# Patient Record
(demographics unavailable — no encounter records)

---

## 2025-02-06 NOTE — ASSESSMENT
[FreeTextEntry1] : MRI L Spine PT Meds.  F/up after MRI   Muscle Relaxants- To help decrease muscle spasm and assist with pain relief. Advised of sedating effects and instructed not to drive, operate heavy machinery, or take with other sedating medications. NSAIDs- Patient warned of risk of medication to GI tract, increased blood pressure, cardiac risk, and risk of fluid retention.  Advised to clear medication with internist or PCP if any concurrent health problem with heart, blood pressure, or GI system exists.

## 2025-02-06 NOTE — HISTORY OF PRESENT ILLNESS
[Not working due to injury] : Work status: not working due to injury [] : yes [de-identified] : DOI 12/27/24 Occ: HHA  2/6/25: 44 y.o patient is here for eval of lower back for WC injury. 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 She stayed home for the weekend to rest, and returned to work for another client and she felt her back twist. She went to her PCP, had XR. Currently OOW since 1/3/24. She c/o pain along her lower back that radiates down her left leg, endorses intermittent N/T down left leg.  [de-identified] : Home health aide

## 2025-02-06 NOTE — IMAGING
[de-identified] : LSPINE Inspection: No rash or ecchymosis Palpation: No tenderness to palpation or spasm in bilateral thoracic and lumbar paraspinal musculature, no SI joint tenderness to palpation ROM: Full with stiffness Strength: 5/5 bilateral hip flexors, knee extensors, ankle dorsiflexors, EHL, ankle plantarflexors Sensation: Sensation present to light touch bilateral L2-S1 distributions Provocative maneuvers: + L straight leg raise [Outside films reviewed] : Outside films reviewed [Disc space narrowing] : Disc space narrowing

## 2025-03-13 NOTE — WORK
[Other: ___] : [unfilled] [Was the competent medical cause of the injury] : was the competent medical cause of the injury [Are consistent with the injury] : are consistent with the injury [Consistent with my objective findings] : consistent with my objective findings [Partial] : partial [Does not reveal pre-existing condition(s) that may affect treatment/prognosis] : does not reveal pre-existing condition(s) that may affect treatment/prognosis

## 2025-03-13 NOTE — ASSESSMENT
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 Advised of need to taper on/off medication and risk of abruptly stopping gabapentin.  NSAIDs- Patient warned of risk of medication to GI tract, increased blood pressure, cardiac risk, and risk of fluid retention.  Advised to clear medication with internist or PCP if any concurrent health problem with heart, blood pressure, or GI system exists.

## 2025-03-13 NOTE — IMAGING
[Outside films reviewed] : Outside films reviewed [Disc space narrowing] : Disc space narrowing [de-identified] : LSPINE Palpation: No tenderness to palpation or spasm in bilateral thoracic and lumbar paraspinal musculature, no SI joint tenderness to palpation ROM: Full with stiffness Strength: 5/5 bilateral hip flexors, knee extensors, ankle dorsiflexors, EHL, ankle plantarflexors Sensation: Sensation present to light touch bilateral L2-S1 distributions Provocative maneuvers: + L straight leg raise

## 2025-03-13 NOTE — HISTORY OF PRESENT ILLNESS
[Not working due to injury] : Work status: not working due to injury [] : yes [de-identified] : DOI 12/27/24 Occ: HHA  2/24/2025 Lumbar MRI  - report noted in chart.  Ind. review- L paracentral HNP L4/5 with LR narrowing ================================================================================================= 2/6/25: 44 y.o patient is here for eval of lower back for WC injury. 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 She stayed home for the weekend to rest, and returned to work for another client and she felt her back twist. She went to her PCP, had XR. Currently OOW since 1/3/24. She c/o pain along her lower back that radiates down her left leg, endorses intermittent N/T down left leg.   03/13/2025: patient is here to discuss MRI results. patient states since last visit no changes. Still pain, N/T down the LLE when walking.  [de-identified] : Home health aide

## 2025-05-08 NOTE — HISTORY OF PRESENT ILLNESS
[Not working due to injury] : Work status: not working due to injury [] : yes [de-identified] : DOI 12/27/24 Occ: HHA  2/24/2025 Lumbar MRI  - report noted in chart.  Ind. review- L paracentral HNP L4/5 with LR narrowing ================================================================================================= 2/6/25: 44 y.o patient is here for eval of lower back for WC injury. 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 She stayed home for the weekend to rest, and returned to work for another client and she felt her back twist. She went to her PCP, had XR. Currently OOW since 1/3/24. She c/o pain along her lower back that radiates down her left leg, endorses intermittent N/T down left leg.   03/13/2025: patient is here to discuss MRI results. patient states since last visit no changes. Still pain, N/T down the LLE when walking.  05/08/2025: patient is here to follow up. she notes pain has increased, had severe pain yesterday after physical therapy. doing physical therapy 3x weekly. Just started PT as it was approved in April. taking gabapentin with mild relief.  Still radiating down the LLE.  [de-identified] : Home health aide

## 2025-05-08 NOTE — ASSESSMENT
[FreeTextEntry1] : L paracentral HNP L4/5 with LR narrowing  C/w PT  Meds F/up in 6 weeks Pain c/s  Gabapentin- Patient advised of sedating effects, instructed not to drive, operate machinery, or take with other sedating medications. Advised of need to taper on/off medication and risk of abruptly stopping gabapentin.  NSAIDs- Patient warned of risk of medication to GI tract, increased blood pressure, cardiac risk, and risk of fluid retention.  Advised to clear medication with internist or PCP if any concurrent health problem with heart, blood pressure, or GI system exists.

## 2025-05-08 NOTE — IMAGING
[Outside films reviewed] : Outside films reviewed [Disc space narrowing] : Disc space narrowing [de-identified] : LSPINE Palpation: No tenderness to palpation or spasm in bilateral thoracic and lumbar paraspinal musculature, no SI joint tenderness to palpation ROM: Full with stiffness Strength: 5/5 bilateral hip flexors, knee extensors, ankle dorsiflexors, EHL, ankle plantarflexors Sensation: Sensation present to light touch bilateral L2-S1 distributions Provocative maneuvers: + L straight leg raise

## 2025-05-19 NOTE — DISCUSSION/SUMMARY
[de-identified] : WC - DOI 12/27/2024  ARSENIO GRULLON is a 44 year-old woman presenting for a NPV for a history of chronic low back pain.    Plan: 1) MRI lumbar spine images reviewed with the patient.

## 2025-05-19 NOTE — HISTORY OF PRESENT ILLNESS
[FreeTextEntry1] : WC - DOI 12/27/2024 5/19/2025: ARSENIO GRULLON is a 44 year-old woman presenting for a NPV for a history of chronic low back pain.    The patient states that average pain over the past week was /10 in severity. Mood: Sleep:   Prior treatment:

## 2025-05-19 NOTE — DATA REVIEWED
[MRI] : MRI [Lumbar Spine] : lumbar spine [Report was reviewed and noted in the chart] : The report was reviewed and noted in the chart [I independently reviewed and interpreted images and report] : I independently reviewed and interpreted images and report [FreeTextEntry1] : 2/24/2025 MRI Lumbar Spine O&C L3-L4: small disc bulge, mild bilateral NF stenosis L4-L5: small disc bulge, mild bilateral facet arthrosis

## 2025-06-14 NOTE — DATA REVIEWED
[MRI] : MRI [Lumbar Spine] : lumbar spine [Report was reviewed and noted in the chart] : The report was reviewed and noted in the chart [I independently reviewed and interpreted images and report] : I independently reviewed and interpreted images and report [FreeTextEntry1] : 2/24/2025 MRI Lumbar Spine O&C L3-L4: small disc bulge, mild bilateral NF stenosis L4-L5: small disc bulge, mild bilateral facet OA  Oriented - self; Oriented - place; Oriented - time

## 2025-06-14 NOTE — DISCUSSION/SUMMARY
[de-identified] : WC - DOI 12/27/2024  ARSENIO GRULLON is a 44 year-old woman presenting for a NPV for a history of chronic low back pain.     Plan: 1) MRI lumbar spine images reviewed with the patient.

## 2025-06-14 NOTE — HISTORY OF PRESENT ILLNESS
[FreeTextEntry1] : WC - DOI 12/27/2024 6/16/2025: ARSENIO GRULLON is a 44 year-old woman presenting for a NPV for a history of chronic low back pain.    The patient states that average pain over the past week was /10 in severity. Mood: Sleep:   Prior treatment: Physical therapy

## 2025-06-14 NOTE — DISCUSSION/SUMMARY
[de-identified] : WC - DOI 12/27/2024  ARSENIO GRULLON is a 44 year-old woman presenting for a NPV for a history of chronic low back pain.     Plan: 1) MRI lumbar spine images reviewed with the patient.

## 2025-06-14 NOTE — DATA REVIEWED
[MRI] : MRI [Lumbar Spine] : lumbar spine [Report was reviewed and noted in the chart] : The report was reviewed and noted in the chart [I independently reviewed and interpreted images and report] : I independently reviewed and interpreted images and report [FreeTextEntry1] : 2/24/2025 MRI Lumbar Spine O&C L3-L4: small disc bulge, mild bilateral NF stenosis L4-L5: small disc bulge, mild bilateral facet OA

## 2025-07-10 NOTE — IMAGING
[Outside films reviewed] : Outside films reviewed [Disc space narrowing] : Disc space narrowing [de-identified] : LSPINE Palpation: No tenderness to palpation or spasm in bilateral thoracic and lumbar paraspinal musculature, no SI joint tenderness to palpation ROM: Full with stiffness Strength: 5/5 bilateral hip flexors, knee extensors, ankle dorsiflexors, EHL, ankle plantarflexors Sensation: Sensation present to light touch bilateral L2-S1 distributions Provocative maneuvers: + L straight leg raise

## 2025-07-10 NOTE — HISTORY OF PRESENT ILLNESS
[Not working due to injury] : Work status: not working due to injury [] : yes [de-identified] : DOI 12/27/24 Occ: HHA  2/24/2025 Lumbar MRI  - report noted in chart.  Ind. review- L paracentral HNP L4/5 with LR narrowing ================================================================================================= 2/6/25: 44 y.o patient is here for eval of lower back for WC injury. 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 She stayed home for the weekend to rest, and returned to work for another client and she felt her back twist. She went to her PCP, had XR. Currently OOW since 1/3/24. She c/o pain along her lower back that radiates down her left leg, endorses intermittent N/T down left leg.   03/13/2025: patient is here to discuss MRI results. patient states since last visit no changes. Still pain, N/T down the LLE when walking.  05/08/2025: patient is here to follow up. she notes pain has increased, had severe pain yesterday after physical therapy. doing physical therapy 3x weekly. Just started PT as it was approved in April. taking gabapentin with mild relief.  Still radiating down the LLE.   07/10/2025: patient is here today to follow up. she notes feeling a little better since last visit. doing physical therapy 3x weekly. Less overall pain down the LLE. 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

## 2025-07-10 NOTE — ASSESSMENT
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 Advised of need to taper on/off medication and risk of abruptly stopping gabapentin.  NSAIDs- Patient warned of risk of medication to GI tract, increased blood pressure, cardiac risk, and risk of fluid retention.  Advised to clear medication with internist or PCP if any concurrent health problem with heart, blood pressure, or GI system exists.